# Patient Record
Sex: MALE | Race: WHITE | NOT HISPANIC OR LATINO
[De-identification: names, ages, dates, MRNs, and addresses within clinical notes are randomized per-mention and may not be internally consistent; named-entity substitution may affect disease eponyms.]

---

## 2020-02-03 ENCOUNTER — APPOINTMENT (OUTPATIENT)
Dept: ORTHOPEDIC SURGERY | Facility: CLINIC | Age: 25
End: 2020-02-03
Payer: COMMERCIAL

## 2020-02-03 VITALS
HEIGHT: 70 IN | SYSTOLIC BLOOD PRESSURE: 131 MMHG | HEART RATE: 52 BPM | DIASTOLIC BLOOD PRESSURE: 76 MMHG | BODY MASS INDEX: 25.05 KG/M2 | WEIGHT: 175 LBS

## 2020-02-03 DIAGNOSIS — M54.5 LOW BACK PAIN: ICD-10-CM

## 2020-02-03 DIAGNOSIS — S23.9XXA SPRAIN OF UNSPECIFIED PARTS OF THORAX, INITIAL ENCOUNTER: ICD-10-CM

## 2020-02-03 DIAGNOSIS — S39.012A STRAIN OF MUSCLE, FASCIA AND TENDON OF LOWER BACK, INITIAL ENCOUNTER: ICD-10-CM

## 2020-02-03 DIAGNOSIS — M54.9 DORSALGIA, UNSPECIFIED: ICD-10-CM

## 2020-02-03 DIAGNOSIS — S13.9XXA SPRAIN OF JOINTS AND LIGAMENTS OF UNSPECIFIED PARTS OF NECK, INITIAL ENCOUNTER: ICD-10-CM
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
## 2020-02-03 DIAGNOSIS — M54.2 CERVICALGIA: ICD-10-CM

## 2020-02-03 PROBLEM — Z00.00 ENCOUNTER FOR PREVENTIVE HEALTH EXAMINATION: Status: ACTIVE | Noted: 2020-02-03

## 2020-02-03 PROCEDURE — 99204 OFFICE O/P NEW MOD 45 MIN: CPT

## 2020-02-03 NOTE — REASON FOR VISIT
[No Fault] : this visit is related to no fault  [Initial Visit] : an initial visit for [Back Pain] : back pain

## 2020-02-03 NOTE — DISCUSSION/SUMMARY
[Medication Risks Reviewed] : Medication risks reviewed [de-identified] : He has symptoms of diffuse discomfort and stiffness in his back as he describes.  He relates no true pain.  He has a normal exam and a normal neurologic exam without neurologic symptoms.  I did not think x-rays of his spine were indicated.  He will use moist heat and he has been started on 2 Aleve twice a day as a nonsteroidal anti-inflammatory medication with instructions to take it for for 5 days after his symptoms have fully resolved.  I will see him for follow-up in 3 weeks on a as needed basis.

## 2020-02-03 NOTE — PHYSICAL EXAM
[de-identified] : He is fully alert and oriented with a normal mood and affect.  He is in no acute distress.  He ambulates with a normal gait including tiptoe and heel walking.  There is no evidence of unsteadiness or spasticity.  There are no cutaneous abnormalities or palpable bony defects of the spine.  There is no evidence of shortness of breath or respiratory distress.  There is no paravertebral muscle spasm or sciatic notch tenderness.  There is some mild bilateral trochanteric sensitivity.  Forward flexion of the spine is to 80 degrees reveals an upper left thoracic paravertebral prominence indicative of a mild scoliosis.  His lower extremity neurological examination reveals 1+ symmetrical reflexes with downgoing toes.  Motor and sensory exam is normal and straight leg raising is negative to 90 degrees.  His hips and knees have a full range of motion with normal stability.  Vascular examination shows no evidence of varicosities and there is no lymphedema.  There are no cutaneous abnormalities of the upper extremities or lower extremities.  He has full painless range of motion of both shoulders.  Range of motion of the cervical spine shows flexion with the chin reaching to the chest, rotation to 90 degrees bilaterally with 90 degrees of extension and tilt to 40 degrees bilaterally all without apparent pain.  His upper extremity neurological examination revealed trace to 1+ symmetrical reflexes with normal motor power and normal sensation.

## 2021-04-13 ENCOUNTER — OUTPATIENT (OUTPATIENT)
Dept: OUTPATIENT SERVICES | Facility: HOSPITAL | Age: 26
LOS: 1 days | End: 2021-04-13
Payer: COMMERCIAL

## 2021-04-13 DIAGNOSIS — Z11.52 ENCOUNTER FOR SCREENING FOR COVID-19: ICD-10-CM

## 2021-04-13 LAB — SARS-COV-2 RNA SPEC QL NAA+PROBE: SIGNIFICANT CHANGE UP

## 2021-04-13 PROCEDURE — U0003: CPT

## 2021-04-13 PROCEDURE — U0005: CPT

## 2021-04-13 PROCEDURE — C9803: CPT
